# Patient Record
Sex: MALE
[De-identification: names, ages, dates, MRNs, and addresses within clinical notes are randomized per-mention and may not be internally consistent; named-entity substitution may affect disease eponyms.]

---

## 2018-10-11 ENCOUNTER — APPOINTMENT (OUTPATIENT)
Dept: DERMATOLOGY | Facility: CLINIC | Age: 73
End: 2018-10-11
Payer: MEDICARE

## 2018-10-11 VITALS — BODY MASS INDEX: 27.32 KG/M2 | HEIGHT: 66 IN | WEIGHT: 170 LBS

## 2018-10-11 DIAGNOSIS — Z91.89 OTHER SPECIFIED PERSONAL RISK FACTORS, NOT ELSEWHERE CLASSIFIED: ICD-10-CM

## 2018-10-11 DIAGNOSIS — B36.0 PITYRIASIS VERSICOLOR: ICD-10-CM

## 2018-10-11 DIAGNOSIS — L30.2 CUTANEOUS AUTOSENSITIZATION: ICD-10-CM

## 2018-10-11 DIAGNOSIS — I87.2 VENOUS INSUFFICIENCY (CHRONIC) (PERIPHERAL): ICD-10-CM

## 2018-10-11 DIAGNOSIS — Z87.2 PERSONAL HISTORY OF DISEASES OF THE SKIN AND SUBCUTANEOUS TISSUE: ICD-10-CM

## 2018-10-11 DIAGNOSIS — Z86.79 PERSONAL HISTORY OF OTHER DISEASES OF THE CIRCULATORY SYSTEM: ICD-10-CM

## 2018-10-11 PROCEDURE — 99203 OFFICE O/P NEW LOW 30 MIN: CPT

## 2018-10-11 RX ORDER — FEXOFENADINE HYDROCHLORIDE 180 MG/1
180 TABLET ORAL
Refills: 0 | Status: ACTIVE | COMMUNITY

## 2018-10-11 RX ORDER — WARFARIN 4 MG/1
TABLET ORAL
Refills: 0 | Status: ACTIVE | COMMUNITY

## 2018-10-11 RX ORDER — SIMVASTATIN 10 MG/1
10 TABLET, FILM COATED ORAL
Refills: 0 | Status: ACTIVE | COMMUNITY

## 2018-10-11 RX ORDER — KETOCONAZOLE 20.5 MG/ML
2 SHAMPOO, SUSPENSION TOPICAL
Qty: 1 | Refills: 3 | Status: ACTIVE | COMMUNITY
Start: 2018-10-11 | End: 1900-01-01

## 2018-10-11 RX ORDER — TRIAMCINOLONE ACETONIDE 1 MG/G
0.1 OINTMENT TOPICAL
Qty: 1 | Refills: 0 | Status: ACTIVE | COMMUNITY
Start: 2018-10-11 | End: 1900-01-01

## 2018-10-11 RX ORDER — ATENOLOL 50 MG/1
50 TABLET ORAL
Refills: 0 | Status: ACTIVE | COMMUNITY

## 2018-10-24 ENCOUNTER — APPOINTMENT (OUTPATIENT)
Dept: DERMATOLOGY | Facility: CLINIC | Age: 73
End: 2018-10-24

## 2018-11-02 ENCOUNTER — APPOINTMENT (OUTPATIENT)
Dept: VASCULAR SURGERY | Facility: CLINIC | Age: 73
End: 2018-11-02
Payer: MEDICARE

## 2018-11-02 VITALS
BODY MASS INDEX: 27.32 KG/M2 | SYSTOLIC BLOOD PRESSURE: 136 MMHG | HEART RATE: 70 BPM | TEMPERATURE: 98.4 F | HEIGHT: 66 IN | WEIGHT: 170 LBS | DIASTOLIC BLOOD PRESSURE: 82 MMHG

## 2018-11-02 DIAGNOSIS — I83.90 ASYMPTOMATIC VARICOSE VEINS OF UNSPECIFIED LOWER EXTREMITY: ICD-10-CM

## 2018-11-02 PROCEDURE — 99203 OFFICE O/P NEW LOW 30 MIN: CPT

## 2018-11-02 PROCEDURE — 93970 EXTREMITY STUDY: CPT

## 2021-04-12 ENCOUNTER — APPOINTMENT (OUTPATIENT)
Dept: UROLOGY | Facility: CLINIC | Age: 76
End: 2021-04-12
Payer: MEDICARE

## 2021-04-12 VITALS
HEIGHT: 66 IN | WEIGHT: 180 LBS | BODY MASS INDEX: 28.93 KG/M2 | HEART RATE: 99 BPM | DIASTOLIC BLOOD PRESSURE: 79 MMHG | SYSTOLIC BLOOD PRESSURE: 127 MMHG | TEMPERATURE: 97.9 F

## 2021-04-12 DIAGNOSIS — I48.91 UNSPECIFIED ATRIAL FIBRILLATION: ICD-10-CM

## 2021-04-12 DIAGNOSIS — R35.0 FREQUENCY OF MICTURITION: ICD-10-CM

## 2021-04-12 PROCEDURE — 99072 ADDL SUPL MATRL&STAF TM PHE: CPT

## 2021-04-12 PROCEDURE — 99204 OFFICE O/P NEW MOD 45 MIN: CPT

## 2021-04-12 NOTE — ADDENDUM
[FreeTextEntry1] : Entered by Cristino Singh, acting as scribe for Dr. Sreekanth Thrasher.\par \par The documentation recorded by the scribe accurately reflects the service I personally performed and the decisions made by me.\par

## 2021-04-12 NOTE — LETTER BODY
[FreeTextEntry1] : The patient does not have a PCP.\par \par REASON FOR VISIT: BPH.\par \par This is a 75 year-old gentleman with atrial fibrillation on Coumadin, status post previous TUMT, presenting with lower urinary tract symptoms and BPH. Patient is here today for evaluation. Patient reports he has weak uroflow, frequency, and hesitancy despite medical therapy. He denies any hematuria or urinary incontinence. His symptoms are aggravated by hydration. He denies any alleviating factors. He denies any pain. All other review of systems are negative. He has no cancer in his family medical history. He has previous surgical history. Past medical history, family history and social history were inquired and were noncontributory to current condition. The patient does not use tobacco or drink alcohol. Medications and allergies were reviewed. He has no known allergies to medication. \par \par On examination, the patient is a healthy-appearing gentleman in no acute distress. He is alert and oriented follows commands. He has normal mood and affect. He is normocephalic. Neck is supple. Oral no thrush. Respirations are unlabored. His abdomen is soft and nontender. Bladder is nonpalpable. No CVA tenderness. Neurologically he is grossly intact. No peripheral edema. Skin without gross abnormality. He has normal male external genitalia. Normal meatus. Bilateral testes are descended intrascrotally and normal to palpation. On rectal examination, there is normal sphincter tone. The prostate is clinically benign without focal induration or nodularity.\par \par Post-void residual on bladder scan today was 0 cc.\par \par ASSESSMENT: BPH.\par \par I counseled the patient on the various etiology of his symptoms. His PVR today was 0 cc. I discussed the natural history of BPH and the treatment options available. I discussed the options of conservative management with fluid in dietary restrictions, herbal therapy, medical therapy, and minimally invasive procedures.  Risk and benefits were discussed. I answered his questions. I recommended he begin a trial of Flomax. I discussed the potential side effects of the medication. I counseled the patient on its use and side effects. If the patient develops any side effects, the patient will discontinue the medication and contact me. He may also consider cystoscopy to evaluate the urinary outlet. He will obtain BMP and PSA today to establish baselines. The patient will also obtain urinalysis today. Risks and alternatives were discussed. I answered the patient questions. The patient will follow-up in 1 month and will contact me with any questions or concerns. Thank you for the opportunity to participate in the care of Mr. DEXTER. I will keep you updated on his progress.\par \par Plan: Trial of Flomax. BMP. PSA. Urinalysis. Follow-up in 1 month.

## 2021-04-13 LAB
ANION GAP SERPL CALC-SCNC: 13 MMOL/L
APPEARANCE: CLEAR
BACTERIA: NEGATIVE
BILIRUBIN URINE: NEGATIVE
BLOOD URINE: NEGATIVE
BUN SERPL-MCNC: 21 MG/DL
CALCIUM SERPL-MCNC: 9.5 MG/DL
CHLORIDE SERPL-SCNC: 101 MMOL/L
CO2 SERPL-SCNC: 25 MMOL/L
COLOR: NORMAL
CREAT SERPL-MCNC: 1.03 MG/DL
GLUCOSE QUALITATIVE U: NEGATIVE
GLUCOSE SERPL-MCNC: 92 MG/DL
HYALINE CASTS: 0 /LPF
KETONES URINE: NEGATIVE
LEUKOCYTE ESTERASE URINE: NEGATIVE
MICROSCOPIC-UA: NORMAL
NITRITE URINE: NEGATIVE
PH URINE: 6.5
POTASSIUM SERPL-SCNC: 4.6 MMOL/L
PROTEIN URINE: NORMAL
PSA FREE FLD-MCNC: 29 %
PSA FREE SERPL-MCNC: 0.4 NG/ML
PSA SERPL-MCNC: 1.36 NG/ML
RED BLOOD CELLS URINE: 0 /HPF
SODIUM SERPL-SCNC: 139 MMOL/L
SPECIFIC GRAVITY URINE: 1.02
SQUAMOUS EPITHELIAL CELLS: 0 /HPF
UROBILINOGEN URINE: NORMAL
WHITE BLOOD CELLS URINE: 0 /HPF

## 2021-05-20 ENCOUNTER — APPOINTMENT (OUTPATIENT)
Dept: UROLOGY | Facility: CLINIC | Age: 76
End: 2021-05-20
Payer: MEDICARE

## 2021-05-20 PROCEDURE — 99072 ADDL SUPL MATRL&STAF TM PHE: CPT

## 2021-05-20 PROCEDURE — 99214 OFFICE O/P EST MOD 30 MIN: CPT

## 2021-05-20 NOTE — LETTER BODY
[FreeTextEntry1] : The patient does not have a PCP.\par \par REASON FOR VISIT: BPH. Erectile dysfunction.\par \par This is a 75 year-old gentleman with atrial fibrillation on Coumadin, status post previous TUMT, presenting with BPH. The patient returns today for follow-up. Since his last visit, the patient reports taking Flomax QD regularly without any side effects or difficulties with the medication. The patient notes improved urine flow and urinary symptoms on the medication. Patient complains of erectile dysfunction. He reports normal libido and sex drive. However he notes decreased penile tumescence. He denies any hematuria or urinary incontinence. He denies any pain. All other review of systems are negative. Past medical history, family history and social history were inquired and were unchanged. Medications and allergies were reviewed. He has no known allergies to medication. \par \par On examination, the patient is a healthy-appearing gentleman in no acute distress. He is alert and oriented follows commands. He has normal mood and affect. He is normocephalic. Neck is supple. Oral no thrush. Respirations are unlabored. His abdomen is soft and nontender. Bladder is nonpalpable. No CVA tenderness. Neurologically he is grossly intact. No peripheral edema. Skin without gross abnormality. He has normal male external genitalia. Normal meatus. Bilateral testes are descended intrascrotally and normal to palpation. On rectal examination, there is normal sphincter tone. The prostate is clinically benign without focal induration or nodularity.\par \par His BMP demonstrated normal renal functions, creatinine 1.03. His PSA was 1.36, which is within normal limits. His urinalysis was unremarkable.\par \par ASSESSMENT: BPH, symptoms improved on Flomax QD. Erectile dysfunction.\par \par I counseled the patient. In terms of his BPH, his symptoms have improved on medical therapy. I recommended the patient continue taking Flomax QD. I renewed the patient's prescription for Flomax today. I encouraged the patient to continue medications regularly as directed. In terms of his erectile dysfunction, I counseled the patient. I discussed the various etiologies of erectile dysfunction. I encouraged the patient to see cardiology to obtain clearance for Viagra. Risks and alternatives were discussed. I answered the patient questions. The patient will follow-up as directed and will contact me with any questions or concerns. Thank you for the opportunity to participate in the care of Mr. DEXTER. I will keep you updated on his progress.\par \par Plan: Continue Flomax. See cardiology. Follow-up as directed.

## 2021-11-18 ENCOUNTER — APPOINTMENT (OUTPATIENT)
Dept: UROLOGY | Facility: CLINIC | Age: 76
End: 2021-11-18
Payer: MEDICARE

## 2021-11-18 PROCEDURE — 99213 OFFICE O/P EST LOW 20 MIN: CPT

## 2021-11-18 NOTE — ADDENDUM
[FreeTextEntry1] : Entered by Liam Capone, acting as scribe for Dr. Sreekanth Thrasher.\par \par The documentation recorded by the scribe accurately reflects the service I personally performed and the decisions made by me.

## 2021-11-18 NOTE — HISTORY OF PRESENT ILLNESS
[FreeTextEntry1] : Please refer to URO Consult note \par \par FUA BPH \par symptoms improved \par continue Flomax QD \par come back in 1 yr \par

## 2021-11-18 NOTE — LETTER BODY
[FreeTextEntry1] : The patient does not have a PCP.\par \par REASON FOR VISIT: BPH. Erectile dysfunction.\par \par This is a 76 year-old gentleman with atrial fibrillation on Coumadin, status post previous TUMT, presenting with erectile dysfunction and BPH. The patient returns today for follow-up. Since his last visit, the patient reports taking Flomax QD regularly without any side effects or difficulties with the medication. The patient notes good uroflow and improved urinary symptoms on the medication. He denies any hematuria or urinary incontinence. He denies any pain. All other review of systems are negative. Past medical history, family history and social history were inquired and were unchanged. Medications and allergies were reviewed. He has no known allergies to medication. \par \par On examination, the patient is a healthy-appearing gentleman in no acute distress. He is alert and oriented follows commands. He has normal mood and affect. He is normocephalic. Neck is supple. Oral no thrush. Respirations are unlabored. His abdomen is soft and nontender. Bladder is nonpalpable. No CVA tenderness. Neurologically he is grossly intact. No peripheral edema. Skin without gross abnormality. He has normal male external genitalia. Normal meatus. Bilateral testes are descended intrascrotally and normal to palpation. On rectal examination, there is normal sphincter tone. The prostate is clinically benign without focal induration or nodularity.\par \par His BMP in April demonstrated normal renal functions, creatinine 1.03. His PSA was 1.36, which is within normal limits. His urinalysis was unremarkable.\par \par ASSESSMENT: BPH, symptoms improved on Flomax QD. Erectile dysfunction.\par \par I counseled the patient. In terms of his BPH, his symptoms have improved on medical therapy. I recommended the patient continue taking Flomax QD. I renewed the patient's prescription for Flomax today. I encouraged the patient to continue medications regularly as directed. Patient understands that if he develops gross hematuria or any urinary discomfort, he will contact me for further evaluation. Risks and alternatives were discussed. I answered the patient questions. The patient will follow-up as directed and will contact me with any questions or concerns. Thank you for the opportunity to participate in the care of Mr. DEXTER. I will keep you updated on his progress.\par \par Plan: Continue Flomax.  Follow-up in 1 year.

## 2022-11-26 ENCOUNTER — RX RENEWAL (OUTPATIENT)
Age: 77
End: 2022-11-26

## 2022-11-28 ENCOUNTER — APPOINTMENT (OUTPATIENT)
Dept: UROLOGY | Facility: CLINIC | Age: 77
End: 2022-11-28

## 2022-11-28 VITALS — RESPIRATION RATE: 17 BRPM | DIASTOLIC BLOOD PRESSURE: 75 MMHG | SYSTOLIC BLOOD PRESSURE: 116 MMHG | HEART RATE: 77 BPM

## 2022-11-28 DIAGNOSIS — Z00.00 ENCOUNTER FOR GENERAL ADULT MEDICAL EXAMINATION W/OUT ABNORMAL FINDINGS: ICD-10-CM

## 2022-11-28 PROCEDURE — 99213 OFFICE O/P EST LOW 20 MIN: CPT

## 2022-11-28 NOTE — LETTER BODY
[FreeTextEntry1] : LENCHO Graham DO\par [address]\[place] [address]\par [phone]\par \par Dear Dr. Graham,\par \par REASON FOR VISIT: BPH. Erectile dysfunction.\par \par This is a 77 year-old gentleman with atrial fibrillation on Coumadin, status post previous TUMT, presenting with erectile dysfunction and BPH. The patient returns today for follow-up. Since his last visit, the patient reports taking Flomax QD regularly without any side effects or difficulties with the medication. The patient notes good uroflow and improved urinary symptoms on the medication. Patient has received approval from cardiologist to take Viagra. He denies any hematuria or urinary incontinence. He denies any pain. All other review of systems are negative. Past medical history, family history and social history were inquired and were unchanged. Medications and allergies were reviewed. He has no known allergies to medication. \par \par On examination, the patient is a healthy-appearing gentleman in no acute distress. He is alert and oriented follows commands. He has normal mood and affect. He is normocephalic. Neck is supple. Oral no thrush. Respirations are unlabored. His abdomen is soft and nontender. Bladder is nonpalpable. No CVA tenderness. Neurologically he is grossly intact. No peripheral edema. Skin without gross abnormality. He has normal male external genitalia. Normal meatus. Bilateral testes are descended intrascrotally and normal to palpation. On rectal examination, there is normal sphincter tone. The prostate is clinically benign without focal induration or nodularity.\par \par ASSESSMENT: BPH, symptoms improved on Flomax QD. Erectile dysfunction.\par \par I counseled the patient. In terms of his BPH, his symptoms have improved on medical therapy. I recommended the patient continue taking Flomax QD. I renewed the patient's prescription for Flomax today. I encouraged the patient to continue medications regularly as directed. In terms of his ED, I encouraged the patient to begin a trial of Viagra. I recommended he obtain PSA and BMP today to ensure stability. Patient understands that if he develops gross hematuria or any urinary discomfort, he will contact me for further evaluation. Risks and alternatives were discussed. I answered the patient questions. The patient will follow-up as directed and will contact me with any questions or concerns. Thank you for the opportunity to participate in the care of Mr. DEXTER. I will keep you updated on his progress.\par \par Plan: Continue Flomax. Trial of Viagra. PSA. BMP.  Follow-up as directed.

## 2022-11-28 NOTE — ADDENDUM
[FreeTextEntry1] : Entered by Tea Heck, acting as scribe for Dr. Sreekanth Thrasher.\par The documentation recorded by the scribe accurately reflects the service I personally performed and the decisions made by me.

## 2022-11-29 LAB
ANION GAP SERPL CALC-SCNC: 11 MMOL/L
BUN SERPL-MCNC: 20 MG/DL
CALCIUM SERPL-MCNC: 9.4 MG/DL
CHLORIDE SERPL-SCNC: 102 MMOL/L
CO2 SERPL-SCNC: 27 MMOL/L
CREAT SERPL-MCNC: 0.99 MG/DL
EGFR: 78 ML/MIN/1.73M2
GLUCOSE SERPL-MCNC: 82 MG/DL
POTASSIUM SERPL-SCNC: 4.4 MMOL/L
PSA FREE FLD-MCNC: 27 %
PSA FREE SERPL-MCNC: 0.43 NG/ML
PSA SERPL-MCNC: 1.56 NG/ML
SODIUM SERPL-SCNC: 140 MMOL/L

## 2022-11-30 ENCOUNTER — NON-APPOINTMENT (OUTPATIENT)
Age: 77
End: 2022-11-30

## 2023-08-22 NOTE — REVIEW OF SYSTEMS
Referral, office notes, face sheet faxed to Dr. Sincere Castillo. Confirmation received. [see HPI] : see HPI [Negative] : Heme/Lymph [Wake up at night to urinate  How many times?  ___] : wakes up to urinate [unfilled] times during the night [Strong urge to urinate] : strong urge to urinate [Strain or push to urinate] : strain or push to urinate [Bladder fullness after urinating] : bladder fullness after urinating [Leakage of urine with urgency] : leakage of urine with urgency

## 2023-11-27 ENCOUNTER — APPOINTMENT (OUTPATIENT)
Dept: UROLOGY | Facility: CLINIC | Age: 78
End: 2023-11-27
Payer: MEDICARE

## 2023-11-27 DIAGNOSIS — N13.8 BENIGN PROSTATIC HYPERPLASIA WITH LOWER URINARY TRACT SYMPMS: ICD-10-CM

## 2023-11-27 DIAGNOSIS — N40.1 BENIGN PROSTATIC HYPERPLASIA WITH LOWER URINARY TRACT SYMPMS: ICD-10-CM

## 2023-11-27 DIAGNOSIS — R22.43 LOCALIZED SWELLING, MASS AND LUMP, LOWER LIMB, BILATERAL: ICD-10-CM

## 2023-11-27 DIAGNOSIS — N52.9 MALE ERECTILE DYSFUNCTION, UNSPECIFIED: ICD-10-CM

## 2023-11-27 LAB
APPEARANCE: CLEAR
BACTERIA: NEGATIVE /HPF
BILIRUBIN URINE: NEGATIVE
BLOOD URINE: NEGATIVE
CAST: 2 /LPF
COLOR: YELLOW
EPITHELIAL CELLS: 1 /HPF
GLUCOSE QUALITATIVE U: NEGATIVE MG/DL
KETONES URINE: NEGATIVE MG/DL
LEUKOCYTE ESTERASE URINE: ABNORMAL
MICROSCOPIC-UA: NORMAL
NITRITE URINE: NEGATIVE
PH URINE: 5.5
PROTEIN URINE: NORMAL MG/DL
RED BLOOD CELLS URINE: 0 /HPF
SPECIFIC GRAVITY URINE: 1.02
UROBILINOGEN URINE: 0.2 MG/DL
WHITE BLOOD CELLS URINE: 1 /HPF

## 2023-11-27 PROCEDURE — 51798 US URINE CAPACITY MEASURE: CPT

## 2023-11-27 PROCEDURE — 99214 OFFICE O/P EST MOD 30 MIN: CPT

## 2023-11-27 RX ORDER — TAMSULOSIN HYDROCHLORIDE 0.4 MG/1
0.4 CAPSULE ORAL
Qty: 90 | Refills: 3 | Status: ACTIVE | COMMUNITY
Start: 2021-04-12 | End: 1900-01-01

## 2023-11-27 RX ORDER — SILDENAFIL 20 MG/1
20 TABLET ORAL
Qty: 30 | Refills: 3 | Status: ACTIVE | COMMUNITY
Start: 2022-11-28 | End: 1900-01-01

## 2024-11-18 ENCOUNTER — NON-APPOINTMENT (OUTPATIENT)
Age: 79
End: 2024-11-18

## 2024-11-18 ENCOUNTER — APPOINTMENT (OUTPATIENT)
Age: 79
End: 2024-11-18
Payer: MEDICARE

## 2024-11-18 PROCEDURE — 92004 COMPRE OPH EXAM NEW PT 1/>: CPT

## 2024-11-18 PROCEDURE — 92014 COMPRE OPH EXAM EST PT 1/>: CPT

## 2024-12-05 ENCOUNTER — NON-APPOINTMENT (OUTPATIENT)
Age: 79
End: 2024-12-05

## 2024-12-05 ENCOUNTER — APPOINTMENT (OUTPATIENT)
Dept: UROLOGY | Facility: CLINIC | Age: 79
End: 2024-12-05
Payer: MEDICARE

## 2024-12-05 VITALS — SYSTOLIC BLOOD PRESSURE: 120 MMHG | DIASTOLIC BLOOD PRESSURE: 93 MMHG

## 2024-12-05 DIAGNOSIS — N13.8 BENIGN PROSTATIC HYPERPLASIA WITH LOWER URINARY TRACT SYMPMS: ICD-10-CM

## 2024-12-05 DIAGNOSIS — N40.1 BENIGN PROSTATIC HYPERPLASIA WITH LOWER URINARY TRACT SYMPMS: ICD-10-CM

## 2024-12-05 PROCEDURE — G2211 COMPLEX E/M VISIT ADD ON: CPT

## 2024-12-05 PROCEDURE — 99214 OFFICE O/P EST MOD 30 MIN: CPT
